# Patient Record
Sex: MALE | Race: WHITE | Employment: FULL TIME | URBAN - METROPOLITAN AREA
[De-identification: names, ages, dates, MRNs, and addresses within clinical notes are randomized per-mention and may not be internally consistent; named-entity substitution may affect disease eponyms.]

---

## 2017-04-17 ENCOUNTER — OFFICE VISIT (OUTPATIENT)
Dept: FAMILY MEDICINE | Facility: CLINIC | Age: 62
End: 2017-04-17
Payer: COMMERCIAL

## 2017-04-17 VITALS
BODY MASS INDEX: 28.79 KG/M2 | HEART RATE: 94 BPM | RESPIRATION RATE: 14 BRPM | TEMPERATURE: 96 F | DIASTOLIC BLOOD PRESSURE: 70 MMHG | WEIGHT: 190 LBS | HEIGHT: 68 IN | SYSTOLIC BLOOD PRESSURE: 126 MMHG

## 2017-04-17 DIAGNOSIS — E11.59 TYPE 2 DIABETES MELLITUS WITH OTHER CIRCULATORY COMPLICATIONS (H): ICD-10-CM

## 2017-04-17 DIAGNOSIS — I10 ESSENTIAL HYPERTENSION WITH GOAL BLOOD PRESSURE LESS THAN 140/90: Chronic | ICD-10-CM

## 2017-04-17 DIAGNOSIS — Z12.5 SCREENING FOR PROSTATE CANCER: ICD-10-CM

## 2017-04-17 DIAGNOSIS — N18.30 CHRONIC KIDNEY DISEASE, STAGE III (MODERATE) (H): Primary | ICD-10-CM

## 2017-04-17 DIAGNOSIS — I10 HYPERTENSION GOAL BP (BLOOD PRESSURE) < 140/90: Chronic | ICD-10-CM

## 2017-04-17 DIAGNOSIS — E78.5 HYPERLIPIDEMIA LDL GOAL <70: Chronic | ICD-10-CM

## 2017-04-17 DIAGNOSIS — E11.51 TYPE II DIABETES MELLITUS WITH PERIPHERAL CIRCULATORY DISORDER (H): ICD-10-CM

## 2017-04-17 DIAGNOSIS — I25.10 CORONARY ARTERY DISEASE INVOLVING NATIVE CORONARY ARTERY OF NATIVE HEART WITHOUT ANGINA PECTORIS: ICD-10-CM

## 2017-04-17 LAB
ALBUMIN SERPL-MCNC: 3.9 G/DL (ref 3.4–5)
ALBUMIN UR-MCNC: 30 MG/DL
ALP SERPL-CCNC: 102 U/L (ref 40–150)
ALT SERPL W P-5'-P-CCNC: 73 U/L (ref 0–70)
ANION GAP SERPL CALCULATED.3IONS-SCNC: 9 MMOL/L (ref 3–14)
APPEARANCE UR: CLEAR
AST SERPL W P-5'-P-CCNC: 30 U/L (ref 0–45)
BASOPHILS # BLD AUTO: 0 10E9/L (ref 0–0.2)
BASOPHILS NFR BLD AUTO: 0.4 %
BILIRUB SERPL-MCNC: 0.7 MG/DL (ref 0.2–1.3)
BILIRUB UR QL STRIP: ABNORMAL
BUN SERPL-MCNC: 25 MG/DL (ref 7–30)
CALCIUM SERPL-MCNC: 9.8 MG/DL (ref 8.5–10.1)
CHLORIDE SERPL-SCNC: 103 MMOL/L (ref 94–109)
CHOLEST SERPL-MCNC: 193 MG/DL
CO2 SERPL-SCNC: 26 MMOL/L (ref 20–32)
COLOR UR AUTO: YELLOW
CREAT SERPL-MCNC: 1.24 MG/DL (ref 0.66–1.25)
CREAT UR-MCNC: 283 MG/DL
DIFFERENTIAL METHOD BLD: NORMAL
EOSINOPHIL # BLD AUTO: 0.2 10E9/L (ref 0–0.7)
EOSINOPHIL NFR BLD AUTO: 2.1 %
ERYTHROCYTE [DISTWIDTH] IN BLOOD BY AUTOMATED COUNT: 13.8 % (ref 10–15)
GFR SERPL CREATININE-BSD FRML MDRD: 59 ML/MIN/1.7M2
GLUCOSE SERPL-MCNC: 298 MG/DL (ref 70–99)
GLUCOSE UR STRIP-MCNC: >=1000 MG/DL
HBA1C MFR BLD: 13.2 % (ref 4.3–6)
HCT VFR BLD AUTO: 44.4 % (ref 40–53)
HDLC SERPL-MCNC: 42 MG/DL
HGB BLD-MCNC: 14.9 G/DL (ref 13.3–17.7)
HGB UR QL STRIP: NEGATIVE
HYALINE CASTS #/AREA URNS LPF: ABNORMAL /LPF (ref 0–2)
KETONES UR STRIP-MCNC: ABNORMAL MG/DL
LDLC SERPL CALC-MCNC: 106 MG/DL
LEUKOCYTE ESTERASE UR QL STRIP: NEGATIVE
LYMPHOCYTES # BLD AUTO: 1.7 10E9/L (ref 0.8–5.3)
LYMPHOCYTES NFR BLD AUTO: 21.3 %
MCH RBC QN AUTO: 30.4 PG (ref 26.5–33)
MCHC RBC AUTO-ENTMCNC: 33.6 G/DL (ref 31.5–36.5)
MCV RBC AUTO: 91 FL (ref 78–100)
MICROALBUMIN UR-MCNC: 118 MG/L
MICROALBUMIN/CREAT UR: 41.7 MG/G CR (ref 0–17)
MONOCYTES # BLD AUTO: 0.7 10E9/L (ref 0–1.3)
MONOCYTES NFR BLD AUTO: 9 %
NEUTROPHILS # BLD AUTO: 5.2 10E9/L (ref 1.6–8.3)
NEUTROPHILS NFR BLD AUTO: 67.2 %
NITRATE UR QL: NEGATIVE
NONHDLC SERPL-MCNC: 151 MG/DL
PH UR STRIP: 5 PH (ref 5–7)
PLATELET # BLD AUTO: 251 10E9/L (ref 150–450)
POTASSIUM SERPL-SCNC: 4.3 MMOL/L (ref 3.4–5.3)
PROT SERPL-MCNC: 7.1 G/DL (ref 6.8–8.8)
PSA SERPL-ACNC: 0.64 UG/L (ref 0–4)
RBC # BLD AUTO: 4.9 10E12/L (ref 4.4–5.9)
RBC #/AREA URNS AUTO: ABNORMAL /HPF (ref 0–2)
SODIUM SERPL-SCNC: 138 MMOL/L (ref 133–144)
SP GR UR STRIP: >1.03 (ref 1–1.03)
TRIGL SERPL-MCNC: 225 MG/DL
TSH SERPL DL<=0.05 MIU/L-ACNC: 2.55 MU/L (ref 0.4–4)
URN SPEC COLLECT METH UR: ABNORMAL
UROBILINOGEN UR STRIP-ACNC: 0.2 EU/DL (ref 0.2–1)
WBC # BLD AUTO: 7.8 10E9/L (ref 4–11)
WBC #/AREA URNS AUTO: ABNORMAL /HPF (ref 0–2)

## 2017-04-17 PROCEDURE — 81001 URINALYSIS AUTO W/SCOPE: CPT | Performed by: FAMILY MEDICINE

## 2017-04-17 PROCEDURE — 99214 OFFICE O/P EST MOD 30 MIN: CPT | Performed by: FAMILY MEDICINE

## 2017-04-17 PROCEDURE — 82043 UR ALBUMIN QUANTITATIVE: CPT | Performed by: FAMILY MEDICINE

## 2017-04-17 PROCEDURE — 80050 GENERAL HEALTH PANEL: CPT | Performed by: FAMILY MEDICINE

## 2017-04-17 PROCEDURE — G0103 PSA SCREENING: HCPCS | Performed by: FAMILY MEDICINE

## 2017-04-17 PROCEDURE — 36415 COLL VENOUS BLD VENIPUNCTURE: CPT | Performed by: FAMILY MEDICINE

## 2017-04-17 PROCEDURE — 83036 HEMOGLOBIN GLYCOSYLATED A1C: CPT | Performed by: FAMILY MEDICINE

## 2017-04-17 PROCEDURE — 80061 LIPID PANEL: CPT | Performed by: FAMILY MEDICINE

## 2017-04-17 NOTE — PROGRESS NOTES
"  SUBJECTIVE:                                                    Buddy Jaramillo is a 61 year old male who presents to clinic today for the following health issues:        Diabetes Follow-up      Patient is checking blood sugars: not at all    Diabetic concerns: None     Symptoms of hypoglycemia (low blood sugar): none     Paresthesias (numbness or burning in feet) or sores: No     Date of last diabetic eye exam: 9/2016     CDE 3 years ago     Hyperlipidemia Follow-Up      Rate your low fat/cholesterol diet?: good    Taking statin?  Yes, no muscle aches from statin    Other lipid medications/supplements?:  none     Hypertension Follow-up      Outpatient blood pressures are not being checked.    Low Salt Diet: no added salt         Amount of exercise or physical activity: 2-3 days/week for an average of 15-30 minutes    Problems taking medications regularly: No    Medication side effects: none    Diet: diabetic          Problem list and histories reviewed & adjusted, as indicated.  Additional history: as documented    Patient Active Problem List   Diagnosis     CAD (coronary artery disease)     Hyperlipidemia LDL goal <70     Hypertension goal BP (blood pressure) < 140/90     Cluster headache     Type II diabetes mellitus with peripheral circulatory disorder (H)     Carotid stent occlusion (H)     Right carotid bruit     Chronic kidney disease, stage III (moderate)     Screening for prostate cancer     Past Surgical History:   Procedure Laterality Date     BACK SURGERY  1980    lumbar disc     CARDIAC SURGERY  2002    Stent placement     CAROTID ENDARTERECTOMY  2007 stent and re-do 2/2014    Left     cartotid en[       ORTHOPEDIC SURGERY      Lumbar disc surgery x 2       Social History   Substance Use Topics     Smoking status: Former Smoker     Packs/day: 1.00     Quit date: 1/10/2014     Smokeless tobacco: Never Used      Comment: \"I think I am\"     Alcohol use No      Comment: Rare special occasion     " "Family History   Problem Relation Age of Onset     HEART DISEASE Mother      C.A.D. Mother 69     HEART DISEASE Father      DIABETES Father      C.A.D. Father 60     3 vessel CAB     CANCER Brother      melanoma           Reviewed and updated as needed this visit by clinical staff  Tobacco  Allergies  Meds  Med Hx  Surg Hx  Fam Hx  Soc Hx      Reviewed and updated as needed this visit by Provider         ROS:  Constitutional, neuro, ENT, endocrine, pulmonary, cardiac, gastrointestinal, genitourinary, musculoskeletal, integument and psychiatric systems are negative, except as otherwise noted.    OBJECTIVE:                                                    /70  Pulse 94  Temp 96  F (35.6  C) (Tympanic)  Resp 14  Ht 5' 7.5\" (1.715 m)  Wt 190 lb (86.2 kg)  BMI 29.32 kg/m2  Body mass index is 29.32 kg/(m^2).  GENERAL APPEARANCE: healthy, alert and no distress         ASSESSMENT/PLAN:                                                        ICD-10-CM    1. Chronic kidney disease, stage III (moderate) N18.3    2. Type II diabetes mellitus with peripheral circulatory disorder (H) E11.51    3. Coronary artery disease involving native coronary artery of native heart without angina pectoris I25.10    4. Hypertension goal BP (blood pressure) < 140/90 I10    5. Hyperlipidemia LDL goal <70 E78.5    6. Screening for prostate cancer Z12.5        There are no Patient Instructions on file for this visit.    Joby Phillips MD  [facility]    "

## 2017-04-17 NOTE — NURSING NOTE
"Chief Complaint   Patient presents with     Diabetes     Hypertension     Lipids       Initial /70  Pulse 94  Temp 96  F (35.6  C) (Tympanic)  Resp 14  Ht 5' 7.5\" (1.715 m)  Wt 190 lb (86.2 kg)  BMI 29.32 kg/m2 Estimated body mass index is 29.32 kg/(m^2) as calculated from the following:    Height as of this encounter: 5' 7.5\" (1.715 m).    Weight as of this encounter: 190 lb (86.2 kg).  Medication Reconciliation: complete       Montse Stevens CMA      "

## 2017-04-17 NOTE — PATIENT INSTRUCTIONS
Will check labs and see back pending review. Lantus and needles were refilled. The rest of his medications are filled at the [facility].

## 2017-04-17 NOTE — MR AVS SNAPSHOT
After Visit Summary   4/17/2017    Buddy Jaramillo    MRN: [id number]           Patient Information     Date Of Birth          1955        Visit Information        Provider Department      4/17/2017 7:45 AM Joby Phillips MD [facility]        Today's Diagnoses     Chronic kidney disease, stage III (moderate)    -  1    Type II diabetes mellitus with peripheral circulatory disorder (H)        Coronary artery disease involving native coronary artery of native heart without angina pectoris        Hypertension goal BP (blood pressure) < 140/90        Hyperlipidemia LDL goal <70        Screening for prostate cancer        Type 2 diabetes mellitus with other circulatory complications (H)        Essential hypertension with goal blood pressure less than 140/90          Care Instructions    Will check labs and see back pending review. Lantus and needles were refilled. The rest of his medications are filled at the [facility].        Follow-ups after your visit        Who to contact     If you have questions or need follow up information about today's clinic visit or your schedule please contact [facility] directly at [phone].  Normal or non-critical lab and imaging results will be communicated to you by [employer], letter or phone within 4 business days after the clinic has received the results. If you do not hear from us within 7 days, please contact the clinic through [employer] or phone. If you have a critical or abnormal lab result, we will notify you by phone as soon as possible.  Submit refill requests through [employer] or call your pharmacy and they will forward the refill request to us. Please allow 3 business days for your refill to be completed.          Additional Information About Your Visit        [employer] Information     [employer] gives you secure access to your electronic health record. If you see a primary care provider, you can  "also send messages to your care team and make appointments. If you have questions, please call your primary care clinic.  If you do not have a primary care provider, please call [phone] and they will assist you.        Care EveryWhere ID     This is your Care EveryWhere ID. This could be used by other organizations to access your [facility] medical records  [id number]        Your Vitals Were     Pulse Temperature Respirations Height BMI (Body Mass Index)       94 96  F (35.6  C) (Tympanic) 14 5' 7.5\" (1.715 m) 29.32 kg/m2        Blood Pressure from Last 3 Encounters:   04/17/17 126/70   06/15/16 120/80   05/11/16 126/74    Weight from Last 3 Encounters:   04/17/17 190 lb (86.2 kg)   06/15/16 197 lb (89.4 kg)   05/11/16 199 lb (90.3 kg)              We Performed the Following     Albumin Random Urine Quantitative     CBC with platelets differential     Comprehensive metabolic panel     Hemoglobin A1c     Lipid Profile     Prostate spec antigen screen     TSH     UA with Microscopic          Today's Medication Changes          These changes are accurate as of: 4/17/17  8:06 AM.  If you have any questions, ask your nurse or doctor.               Start taking these medicines.        Dose/Directions    insulin glargine 100 UNIT/ML injection   Commonly known as:  LANTUS   Used for:  Type 2 diabetes mellitus with other circulatory complications (H)   Replaces:  insulin glargine 100 UNIT/ML injection   Started by:  Joby Phillips MD        Dose:  22 Units   Inject 22 Units Subcutaneous At Bedtime   Quantity:  9 mL   Refills:  1         Stop taking these medicines if you haven't already. Please contact your care team if you have questions.     clopidogrel 75 MG tablet   Commonly known as:  PLAVIX   Stopped by:  Joby Phillips MD           insulin glargine 100 UNIT/ML injection   Commonly known as:  LANTUS SOLOSTAR   Replaced by:  insulin glargine 100 UNIT/ML injection   Stopped by:  Joby Phillips" MD Osvaldo                Where to get your medicines      These medications were sent to [id number] - [place], [place] - [address] [facility]  [address] [facility], [facility] [address]     Phone:  [phone]     insulin pen needle 31G X 5 MM         Some of these will need a paper prescription and others can be bought over the counter.  Ask your nurse if you have questions.     Bring a paper prescription for each of these medications     insulin glargine 100 UNIT/ML injection                Primary Care Provider Office Phone # Fax #    Joby Phillips [phone]       [facility] [address] [facility] [facility] [address]        Thank you!     Thank you for choosing [facility]  for your care. Our goal is always to provide you with excellent care. Hearing back from our patients is one way we can continue to improve our services. Please take a few minutes to complete the written survey that you may receive in the mail after your visit with us. Thank you!             Your Updated Medication List - Protect others around you: Learn how to safely use, store and throw away your medicines at www.disposemymeds.org.          This list is accurate as of: 4/17/17  8:06 AM.  Always use your most recent med list.                   Brand Name Dispense Instructions for use    aspirin  MG EC tablet     90 tablet    Take 1 tablet (325 mg) by mouth daily       blood glucose monitoring test strip    MAXIMILIAN CONTOUR NEXT    300 strip    Test 3 times daily       glipiZIDE 5 MG tablet    GLUCOTROL    90 tablet    Take 1 tablet (5 mg) by mouth 2 times daily (before meals)       IBUPROFEN PO      Take 800 mg by mouth daily       insulin glargine 100 UNIT/ML injection    LANTUS    9 mL    Inject 22 Units Subcutaneous At Bedtime       insulin pen needle 31G X 5 MM     100 each    Pt uses insulin 1x/day       lisinopril 10 MG tablet    PRINIVIL/ZESTRIL    90  tablet    Take 0.5 tablets (5 mg) by mouth daily       metFORMIN 1000 MG tablet    GLUCOPHAGE    180 tablet    Take 1 tablet (1,000 mg) by mouth 2 times daily (with meals)       Multi-vitamin Tabs tablet   Generic drug:  multivitamin, therapeutic with minerals      Take  by mouth daily.       * order for DME     1 each    Glucometer, brand as covered by insurance.       * order for DME     100 each    Test strips for pt's glucometer, brand as covered by insurance. Test daily and prn.       saxagliptin 5 MG Tabs tablet    ONGLYZA    90 tablet    Take 1 tablet (5 mg) by mouth daily       simvastatin 40 MG tablet    ZOCOR    90 tablet    Take 1 tablet (40 mg) by mouth At Bedtime       verapamil 240 MG CR tablet    CALAN-SR    90 tablet    Take 1 tablet (240 mg) by mouth daily       VITAMIN C PO      Take 500 mg by mouth daily.       * Notice:  This list has 2 medication(s) that are the same as other medications prescribed for you. Read the directions carefully, and ask your doctor or other care provider to review them with you.

## 2017-04-21 DIAGNOSIS — I10 ESSENTIAL HYPERTENSION WITH GOAL BLOOD PRESSURE LESS THAN 140/90: Chronic | ICD-10-CM

## 2017-04-21 NOTE — TELEPHONE ENCOUNTER
verapamil (CALAN-SR) 240 MG tablet      Last Written Prescription Date: 6/15/16  Last Fill Quantity: 90, # refills: 1  Last Office Visit with G, P or [facility] prescribing provider: 4/17/17       Potassium   Date Value Ref Range Status   04/17/2017 4.3 3.4 - 5.3 mmol/L Final     Creatinine   Date Value Ref Range Status   04/17/2017 1.24 0.66 - 1.25 mg/dL Final     BP Readings from Last 3 Encounters:   04/17/17 126/70   06/15/16 120/80   05/11/16 126/74

## 2017-04-24 RX ORDER — VERAPAMIL HYDROCHLORIDE 240 MG/1
240 TABLET, FILM COATED, EXTENDED RELEASE ORAL DAILY
Qty: 90 TABLET | Refills: 3 | Status: SHIPPED | OUTPATIENT
Start: 2017-04-24

## 2017-08-04 NOTE — TELEPHONE ENCOUNTER
Routing refill request to provider for review/approval because:  Labs out of range:    Lab Results   Component Value Date    A1C 13.2 04/17/2017    A1C 11.0 05/11/2016    A1C 8.6 08/31/2015    A1C 6.6 03/04/2015    A1C 6.3 07/07/2014

## 2017-08-05 RX ORDER — INSULIN GLARGINE 100 [IU]/ML
INJECTION, SOLUTION SUBCUTANEOUS
Qty: 9 ML | Refills: 0 | Status: SHIPPED | OUTPATIENT
Start: 2017-08-05 | End: 2017-09-12

## 2017-08-22 ENCOUNTER — OFFICE VISIT (OUTPATIENT)
Dept: FAMILY MEDICINE | Facility: CLINIC | Age: 62
End: 2017-08-22
Payer: COMMERCIAL

## 2017-08-22 VITALS
SYSTOLIC BLOOD PRESSURE: 130 MMHG | RESPIRATION RATE: 16 BRPM | TEMPERATURE: 98 F | WEIGHT: 192 LBS | HEART RATE: 68 BPM | DIASTOLIC BLOOD PRESSURE: 70 MMHG | BODY MASS INDEX: 29.63 KG/M2

## 2017-08-22 DIAGNOSIS — N18.30 CHRONIC KIDNEY DISEASE, STAGE III (MODERATE) (H): ICD-10-CM

## 2017-08-22 DIAGNOSIS — E11.51 TYPE II DIABETES MELLITUS WITH PERIPHERAL CIRCULATORY DISORDER (H): ICD-10-CM

## 2017-08-22 DIAGNOSIS — I25.10 CORONARY ARTERY DISEASE INVOLVING NATIVE CORONARY ARTERY OF NATIVE HEART WITHOUT ANGINA PECTORIS: ICD-10-CM

## 2017-08-22 DIAGNOSIS — I10 HYPERTENSION GOAL BP (BLOOD PRESSURE) < 140/90: Primary | Chronic | ICD-10-CM

## 2017-08-22 DIAGNOSIS — E78.5 HYPERLIPIDEMIA LDL GOAL <70: Chronic | ICD-10-CM

## 2017-08-22 PROCEDURE — 99214 OFFICE O/P EST MOD 30 MIN: CPT | Performed by: FAMILY MEDICINE

## 2017-08-22 NOTE — PROGRESS NOTES
"  SUBJECTIVE:   Buddy Jaramillo is a 61 year old male who presents to clinic today for the following health issues:      Diabetes Follow-up      Patient is checking blood sugars: not at all    Diabetic concerns: None     Symptoms of hypoglycemia (low blood sugar): none     Paresthesias (numbness or burning in feet) or sores: No     Date of last diabetic eye exam:     Hypertension Follow-up      Outpatient blood pressures are not being checked.    Low Salt Diet: no added salt          Amount of exercise or physical activity: None    Problems taking medications regularly: No    Medication side effects: none- CONCERNED ABOUT ED  Diet: regular (no restrictions)      ED      Duration: 1.5 years    Description (location/character/radiation): n/a    Intensity:  moderate    Accompanying signs and symptoms: none    History (similar episodes/previous evaluation): None    Precipitating or alleviating factors: None    Therapies tried and outcome: None         Problem list and histories reviewed & adjusted, as indicated.  Additional history: as documented    Patient Active Problem List   Diagnosis     CAD (coronary artery disease)     Hyperlipidemia LDL goal <70     Hypertension goal BP (blood pressure) < 140/90     Cluster headache     Type II diabetes mellitus with peripheral circulatory disorder (H)     Carotid stent occlusion (H)     Right carotid bruit     Chronic kidney disease, stage III (moderate)     Screening for prostate cancer     Past Surgical History:   Procedure Laterality Date     BACK SURGERY  1980    lumbar disc     CARDIAC SURGERY  2002    Stent placement     CAROTID ENDARTERECTOMY  2007 stent and re-do 2/2014    Left     cartotid en[       ORTHOPEDIC SURGERY      Lumbar disc surgery x 2       Social History   Substance Use Topics     Smoking status: Former Smoker     Packs/day: 1.00     Quit date: 1/10/2014     Smokeless tobacco: Never Used      Comment: \"I think I am\"     Alcohol use No      Comment: " Rare special occasion     Family History   Problem Relation Age of Onset     HEART DISEASE Mother      C.A.D. Mother 69     HEART DISEASE Father      DIABETES Father      C.A.D. Father 60     3 vessel CAB     CANCER Brother      melanoma             Reviewed and updated as needed this visit by clinical staffTobacco  Meds  Med Hx  Surg Hx  Fam Hx  Soc Hx      Reviewed and updated as needed this visit by Provider         ROS:  Constitutional, neuro, ENT, endocrine, pulmonary, cardiac, gastrointestinal, genitourinary, musculoskeletal, integument and psychiatric systems are negative, except as otherwise noted.  RESP:NEGATIVE for significant cough or SOB  CV: NEGATIVE for chest pain, palpitations or peripheral edema  ENDOCRINE: NEGATIVE for temperature intolerance, skin/hair changes, polydipsia, polyphagia and polyuria      OBJECTIVE:                                                    /70  Pulse 68  Temp 98  F (36.7  C) (Tympanic)  Resp 16  Wt 192 lb (87.1 kg)  BMI 29.63 kg/m2  Body mass index is 29.63 kg/(m^2).  GENERAL APPEARANCE: healthy, alert and no distress         ASSESSMENT/PLAN:                                                        ICD-10-CM    1. Hypertension goal BP (blood pressure) < 140/90 I10 UA with Microscopic     CBC with platelets differential     Comprehensive metabolic panel   2. Hyperlipidemia LDL goal <70 E78.5 Lipid Profile   3. Chronic kidney disease, stage III (moderate) N18.3    4. Type II diabetes mellitus with peripheral circulatory disorder (H) E11.51 Albumin Random Urine Quantitative     Hemoglobin A1c   5. Coronary artery disease involving native coronary artery of native heart without angina pectoris I25.10        Patient Instructions   Will await labs before addressing erectile dysfunction issues. Patient was encouraged to pay more attention to his diabetes. Clearly, he has not been doing that.      Joby Phillips MD  [facility]

## 2017-08-22 NOTE — NURSING NOTE
"Chief Complaint   Patient presents with     Hypertension     Diabetes     Erectile Dysfunction       Initial /70  Pulse 68  Temp 98  F (36.7  C) (Tympanic)  Resp 16  Wt 192 lb (87.1 kg)  BMI 29.63 kg/m2 Estimated body mass index is 29.63 kg/(m^2) as calculated from the following:    Height as of 4/17/17: 5' 7.5\" (1.715 m).    Weight as of this encounter: 192 lb (87.1 kg).  Medication Reconciliation: complete     Montse Stevens CMA      "

## 2017-08-23 DIAGNOSIS — E11.51 TYPE II DIABETES MELLITUS WITH PERIPHERAL CIRCULATORY DISORDER (H): ICD-10-CM

## 2017-08-23 DIAGNOSIS — I10 HYPERTENSION GOAL BP (BLOOD PRESSURE) < 140/90: Chronic | ICD-10-CM

## 2017-08-23 DIAGNOSIS — E78.5 HYPERLIPIDEMIA LDL GOAL <70: Chronic | ICD-10-CM

## 2017-08-23 LAB
ALBUMIN SERPL-MCNC: 3.9 G/DL (ref 3.4–5)
ALBUMIN UR-MCNC: 100 MG/DL
ALP SERPL-CCNC: 94 U/L (ref 40–150)
ALT SERPL W P-5'-P-CCNC: 51 U/L (ref 0–70)
ANION GAP SERPL CALCULATED.3IONS-SCNC: 10 MMOL/L (ref 3–14)
APPEARANCE UR: CLEAR
AST SERPL W P-5'-P-CCNC: 25 U/L (ref 0–45)
BASOPHILS # BLD AUTO: 0.1 10E9/L (ref 0–0.2)
BASOPHILS NFR BLD AUTO: 0.7 %
BILIRUB SERPL-MCNC: 0.8 MG/DL (ref 0.2–1.3)
BILIRUB UR QL STRIP: NEGATIVE
BUN SERPL-MCNC: 18 MG/DL (ref 7–30)
CALCIUM SERPL-MCNC: 6.5 MG/DL (ref 8.5–10.1)
CHLORIDE SERPL-SCNC: 105 MMOL/L (ref 94–109)
CHOLEST SERPL-MCNC: 141 MG/DL
CO2 SERPL-SCNC: 24 MMOL/L (ref 20–32)
COLOR UR AUTO: YELLOW
CREAT SERPL-MCNC: 1.1 MG/DL (ref 0.66–1.25)
CREAT UR-MCNC: 169 MG/DL
DIFFERENTIAL METHOD BLD: NORMAL
EOSINOPHIL # BLD AUTO: 0.3 10E9/L (ref 0–0.7)
EOSINOPHIL NFR BLD AUTO: 3.3 %
ERYTHROCYTE [DISTWIDTH] IN BLOOD BY AUTOMATED COUNT: 13.8 % (ref 10–15)
GFR SERPL CREATININE-BSD FRML MDRD: 67 ML/MIN/1.7M2
GLUCOSE SERPL-MCNC: 209 MG/DL (ref 70–99)
GLUCOSE UR STRIP-MCNC: 500 MG/DL
HBA1C MFR BLD: 12.4 % (ref 4.3–6)
HCT VFR BLD AUTO: 43.4 % (ref 40–53)
HDLC SERPL-MCNC: 57 MG/DL
HGB BLD-MCNC: 14.4 G/DL (ref 13.3–17.7)
HGB UR QL STRIP: NEGATIVE
KETONES UR STRIP-MCNC: NEGATIVE MG/DL
LDLC SERPL CALC-MCNC: 53 MG/DL
LEUKOCYTE ESTERASE UR QL STRIP: NEGATIVE
LYMPHOCYTES # BLD AUTO: 2.5 10E9/L (ref 0.8–5.3)
LYMPHOCYTES NFR BLD AUTO: 33 %
MCH RBC QN AUTO: 30.6 PG (ref 26.5–33)
MCHC RBC AUTO-ENTMCNC: 33.2 G/DL (ref 31.5–36.5)
MCV RBC AUTO: 92 FL (ref 78–100)
MICROALBUMIN UR-MCNC: 283 MG/L
MICROALBUMIN/CREAT UR: 167.46 MG/G CR (ref 0–17)
MONOCYTES # BLD AUTO: 0.8 10E9/L (ref 0–1.3)
MONOCYTES NFR BLD AUTO: 11.1 %
NEUTROPHILS # BLD AUTO: 4 10E9/L (ref 1.6–8.3)
NEUTROPHILS NFR BLD AUTO: 51.9 %
NITRATE UR QL: NEGATIVE
NONHDLC SERPL-MCNC: 84 MG/DL
PH UR STRIP: 5.5 PH (ref 5–7)
PLATELET # BLD AUTO: 281 10E9/L (ref 150–450)
POTASSIUM SERPL-SCNC: 4.1 MMOL/L (ref 3.4–5.3)
PROT SERPL-MCNC: 6.9 G/DL (ref 6.8–8.8)
RBC # BLD AUTO: 4.71 10E12/L (ref 4.4–5.9)
RBC #/AREA URNS AUTO: ABNORMAL /HPF
SODIUM SERPL-SCNC: 139 MMOL/L (ref 133–144)
SOURCE: ABNORMAL
SP GR UR STRIP: 1.02 (ref 1–1.03)
TRIGL SERPL-MCNC: 154 MG/DL
UROBILINOGEN UR STRIP-ACNC: 0.2 EU/DL (ref 0.2–1)
WBC # BLD AUTO: 7.6 10E9/L (ref 4–11)
WBC #/AREA URNS AUTO: ABNORMAL /HPF

## 2017-08-23 PROCEDURE — 80061 LIPID PANEL: CPT | Performed by: FAMILY MEDICINE

## 2017-08-23 PROCEDURE — 85025 COMPLETE CBC W/AUTO DIFF WBC: CPT | Performed by: FAMILY MEDICINE

## 2017-08-23 PROCEDURE — 81001 URINALYSIS AUTO W/SCOPE: CPT | Performed by: FAMILY MEDICINE

## 2017-08-23 PROCEDURE — 36415 COLL VENOUS BLD VENIPUNCTURE: CPT | Performed by: FAMILY MEDICINE

## 2017-08-23 PROCEDURE — 82043 UR ALBUMIN QUANTITATIVE: CPT | Performed by: FAMILY MEDICINE

## 2017-08-23 PROCEDURE — 83036 HEMOGLOBIN GLYCOSYLATED A1C: CPT | Performed by: FAMILY MEDICINE

## 2017-08-23 PROCEDURE — 80053 COMPREHEN METABOLIC PANEL: CPT | Performed by: FAMILY MEDICINE

## 2017-08-26 NOTE — PATIENT INSTRUCTIONS
Will await labs before addressing erectile dysfunction issues. Patient was encouraged to pay more attention to his diabetes. Clearly, he has not been doing that.

## 2017-09-12 ENCOUNTER — OFFICE VISIT (OUTPATIENT)
Dept: FAMILY MEDICINE | Facility: CLINIC | Age: 62
End: 2017-09-12
Payer: COMMERCIAL

## 2017-09-12 VITALS
WEIGHT: 191 LBS | RESPIRATION RATE: 16 BRPM | SYSTOLIC BLOOD PRESSURE: 128 MMHG | HEART RATE: 66 BPM | BODY MASS INDEX: 29.47 KG/M2 | TEMPERATURE: 97 F | DIASTOLIC BLOOD PRESSURE: 60 MMHG

## 2017-09-12 DIAGNOSIS — I25.10 CORONARY ARTERY DISEASE INVOLVING NATIVE CORONARY ARTERY OF NATIVE HEART WITHOUT ANGINA PECTORIS: ICD-10-CM

## 2017-09-12 DIAGNOSIS — E11.51 TYPE II DIABETES MELLITUS WITH PERIPHERAL CIRCULATORY DISORDER (H): ICD-10-CM

## 2017-09-12 DIAGNOSIS — Z13.89 SCREENING FOR DIABETIC PERIPHERAL NEUROPATHY: ICD-10-CM

## 2017-09-12 DIAGNOSIS — Z79.4 TYPE 2 DIABETES MELLITUS WITH DIABETIC NEPHROPATHY, WITH LONG-TERM CURRENT USE OF INSULIN (H): Primary | ICD-10-CM

## 2017-09-12 DIAGNOSIS — E11.21 TYPE 2 DIABETES MELLITUS WITH DIABETIC NEPHROPATHY, WITH LONG-TERM CURRENT USE OF INSULIN (H): Primary | ICD-10-CM

## 2017-09-12 DIAGNOSIS — E66.3 PATIENT OVERWEIGHT: ICD-10-CM

## 2017-09-12 PROCEDURE — 99214 OFFICE O/P EST MOD 30 MIN: CPT | Performed by: FAMILY MEDICINE

## 2017-09-12 PROCEDURE — 99207 C FOOT EXAM  NO CHARGE: CPT | Performed by: FAMILY MEDICINE

## 2017-09-12 NOTE — MR AVS SNAPSHOT
After Visit Summary   9/12/2017    Buddy Jaramillo    MRN: [id number]           Patient Information     Date Of Birth          1955        Visit Information        Provider Department      9/12/2017 8:00 AM Joby Phillips MD [facility]        Today's Diagnoses     Type 2 diabetes mellitus with diabetic nephropathy, with long-term current use of insulin (H)    -  1    Type II diabetes mellitus with peripheral circulatory disorder (H)        Coronary artery disease involving native coronary artery of native heart without angina pectoris        Screening for diabetic peripheral neuropathy          Care Instructions      Preventive Health Recommendations  Male Ages 50 - 64    Yearly exam:             See your health care provider every year in order to  o   Review health changes.   o   Discuss preventive care.    o   Review your medicines if your doctor has prescribed any.     Have a cholesterol test every 5 years, or more frequently if you are at risk for high cholesterol/heart disease.     Have a diabetes test (fasting glucose) every three years. If you are at risk for diabetes, you should have this test more often.     Have a colonoscopy at age 50, or have a yearly FIT test (stool test). These exams will check for colon cancer.      Talk with your health care provider about whether or not a prostate cancer screening test (PSA) is right for you.    You should be tested each year for STDs (sexually transmitted diseases), if you re at risk.     Shots: Get a flu shot each year. Get a tetanus shot every 10 years.     Nutrition:    Eat at least 5 servings of fruits and vegetables daily.     Eat whole-grain bread, whole-wheat pasta and brown rice instead of white grains and rice.     Talk to your provider about Calcium and Vitamin D.     Lifestyle    Exercise for at least 150 minutes a week (30 minutes a day, 5 days a week). This will help you control your  weight and prevent disease.     Limit alcohol to one drink per day.     No smoking.     Wear sunscreen to prevent skin cancer.     See your dentist every six months for an exam and cleaning.     See your eye doctor every 1 to 2 years.            Follow-ups after your visit        Additional Services     DIABETES EDUCATOR REFERRAL       DIABETES SELF MANAGEMENT TRAINING (DSMT)      Your provider has referred you to Diabetes Education: FMG: Diabetes Education - All [facility] [phone]   [email or web address]    Type of training and number of hours: Previous Diagnosis: Follow-up DSMT - 2 hours.    Medicare covers: 10 hours of initial DSMT in 12 month period from the time of first visit, plus 2 hours of follow-up DSMT annually, and additional hours as requested for insulin training.    Diabetes Type: Type 2 - On Insulin             Diabetes Co-Morbidities: none               A1C Goal:  <8.0       A1C is: Lab Results       Component                Value               Date                       A1C                      12.4                08/23/2017              If an urgent visit is needed or A1C is above 12, Care Team to call the Diabetes Education Team at [phone] or send an In Basket message to the Diabetes Education Pool (P DIAB ED-PATIENT CARE).    Diabetes Education Topics: Comprehensive Knowledge Assessment and Instruction and Blood glucose meter instruction     Special Educational Needs Requiring Individual DSMT: None       MEDICAL NUTRITION THERAPY (MNT) for Diabetes    Medical Nutrition Therapy with a Registered Dietitian can be provided in coordination with Diabetes Self-Management Training to assist in achieving optimal diabetes management.    MNT Type and Hours: Do not initiate MNT at this time.                       Medicare will cover: 3 hours initial MNT in 12 month period after first visit, plus 2 hours of follow-up MNT annually    Please be  "aware that coverage of these services is subject to the terms and limitations of your health insurance plan.  Call member services at your health plan to determine Diabetes Self-Management Training benefits and ask which blood glucose monitor brands are covered by your plan.      Please bring the following with you to your appointment:    (1)  List of current medications   (2)  List of Blood Glucose Monitor brands that are covered by your insurance plan  (3)  Blood Glucose Monitor and log book  (4)   Food records for the 3 days prior to your visit    The Certified Diabetes Educator may make diabetes medication adjustments per the CDE Protocol and Collaborative Practice Agreement.                  Who to contact     If you have questions or need follow up information about today's clinic visit or your schedule please contact [facility] directly at [phone].  Normal or non-critical lab and imaging results will be communicated to you by MyChart, letter or phone within 4 business days after the clinic has received the results. If you do not hear from us within 7 days, please contact the clinic through MyChart or phone. If you have a critical or abnormal lab result, we will notify you by phone as soon as possible.  Submit refill requests through [employer] or call your pharmacy and they will forward the refill request to us. Please allow 3 business days for your refill to be completed.          Additional Information About Your Visit        [employer] Information     [employer] lets you send messages to your doctor, view your test results, renew your prescriptions, schedule appointments and more. To sign up, go to [email or web address] . Click on \"Log in\" on the left side of the screen, which will take you to the Welcome page. Then click on \"Sign up Now\" on the right side of the page.     You will be asked to enter the access code listed below, as well as some personal information. Please " follow the directions to create your username and password.     Your access code is: [email or web address]  Expires: 2017  8:41 AM     Your access code will  in 90 days. If you need help or a new code, please call your [facility] clinic or [phone].        Care EveryWhere ID     This is your Care EveryWhere ID. This could be used by other organizations to access your [facility] medical records  [id number]        Your Vitals Were     Pulse Temperature Respirations BMI (Body Mass Index)          66 97  F (36.1  C) (Tympanic) 16 29.47 kg/m2         Blood Pressure from Last 3 Encounters:   17 128/60   17 130/70   17 126/70    Weight from Last 3 Encounters:   17 191 lb (86.6 kg)   17 192 lb (87.1 kg)   17 190 lb (86.2 kg)              We Performed the Following     DIABETES EDUCATOR REFERRAL     FOOT EXAM  NO CHARGE [90642.114]          Today's Medication Changes          These changes are accurate as of: 17  8:41 AM.  If you have any questions, ask your nurse or doctor.               These medicines have changed or have updated prescriptions.        Dose/Directions    insulin glargine 100 UNIT/ML injection   Commonly known as:  LANTUS SOLOSTAR   This may have changed:  See the new instructions.   Changed by:  Joby Phillips MD        Dose:  26 Units   Inject 26 Units Subcutaneous At Bedtime   Quantity:  9 mL   Refills:  0            Where to get your medicines      Some of these will need a paper prescription and others can be bought over the counter.  Ask your nurse if you have questions.     You don't need a prescription for these medications     insulin glargine 100 UNIT/ML injection                Primary Care Provider Office Phone # Fax #    Joby Phillips [phone] [address] [facility] [address]        Equal Access to Services     ISI ANGUIANO AH: Hadii jonel ku danao Sochris, waaxda luqadaha, qaybta kaalmaanderson peguero,  quique handjimmie castro'aan ah. So [facility] [phone].    ATENCIÓN: Si jaradla segun, tiene a ware disposición servicios gratuitos de asistencia lingüística. Matt miranda [phone].    We comply with applicable federal civil rights laws and [place] laws. We do not discriminate on the basis of race, color, national origin, age, disability sex, sexual orientation or gender identity.            Thank you!     Thank you for choosing [facility]  for your care. Our goal is always to provide you with excellent care. Hearing back from our patients is one way we can continue to improve our services. Please take a few minutes to complete the written survey that you may receive in the mail after your visit with us. Thank you!             Your Updated Medication List - Protect others around you: Learn how to safely use, store and throw away your medicines at www.disposemymeds.org.          This list is accurate as of: 9/12/17  8:41 AM.  Always use your most recent med list.                   Brand Name Dispense Instructions for use Diagnosis    aspirin  MG EC tablet     90 tablet    Take 1 tablet (325 mg) by mouth daily    Carotid artery disease (H)       blood glucose monitoring test strip    MAXIMILIAN CONTOUR NEXT    300 strip    Test 3 times daily    Type 2 diabetes, HbA1C goal < 8% (H)       glipiZIDE 5 MG tablet    GLUCOTROL    90 tablet    Take 1 tablet (5 mg) by mouth 2 times daily (before meals)    Type 2 diabetes mellitus with other circulatory complications (H)       IBUPROFEN PO      Take 800 mg by mouth daily        insulin glargine 100 UNIT/ML injection    LANTUS SOLOSTAR    9 mL    Inject 26 Units Subcutaneous At Bedtime        insulin pen needle 31G X 5 MM     100 each    Pt uses insulin 1x/day    Type II diabetes mellitus with peripheral circulatory disorder (H)       lisinopril 10 MG tablet    PRINIVIL/ZESTRIL    90 tablet    Take 0.5 tablets (5 mg) by mouth daily     Type II diabetes mellitus with peripheral circulatory disorder (H), Essential hypertension with goal blood pressure less than 140/90       metFORMIN 1000 MG tablet    GLUCOPHAGE    180 tablet    Take 1 tablet (1,000 mg) by mouth 2 times daily (with meals)    Type II diabetes mellitus with peripheral circulatory disorder (H)       Multi-vitamin Tabs tablet   Generic drug:  multivitamin, therapeutic with minerals      Take  by mouth daily.        * order for DME     1 each    Glucometer, brand as covered by insurance.    Type II diabetes mellitus with peripheral circulatory disorder (H)       * order for DME     100 each    Test strips for pt's glucometer, brand as covered by insurance. Test daily and prn.    Type II diabetes mellitus with peripheral circulatory disorder (H)       saxagliptin 5 MG Tabs tablet    ONGLYZA    90 tablet    Take 1 tablet (5 mg) by mouth daily    Type 2 diabetes mellitus with other circulatory complications (H)       simvastatin 40 MG tablet    ZOCOR    90 tablet    Take 1 tablet (40 mg) by mouth At Bedtime    Type 2 diabetes mellitus with other circulatory complications (H)       verapamil 240 MG CR tablet    CALAN-SR    90 tablet    Take 1 tablet (240 mg) by mouth daily    Essential hypertension with goal blood pressure less than 140/90       VITAMIN C PO      Take 500 mg by mouth daily.        * Notice:  This list has 2 medication(s) that are the same as other medications prescribed for you. Read the directions carefully, and ask your doctor or other care provider to review them with you.

## 2017-09-12 NOTE — NURSING NOTE
"Chief Complaint   Patient presents with     Diabetes     lab results     Lipids     Refill Request       Initial /60  Pulse 66  Temp 97  F (36.1  C) (Tympanic)  Resp 16  Wt 191 lb (86.6 kg)  BMI 29.47 kg/m2 Estimated body mass index is 29.47 kg/(m^2) as calculated from the following:    Height as of 4/17/17: 5' 7.5\" (1.715 m).    Weight as of this encounter: 191 lb (86.6 kg).  Medication Reconciliation: complete     Montse Stevens CMA      "

## 2017-09-12 NOTE — PROGRESS NOTES
"  Physical     SUBJECTIVE:   Buddy Jaramillo is a 62 year old male who presents to clinic today for the following health issues:      Diabetes Follow-up      Patient is checking blood sugars: not at all    Diabetic concerns: None     Symptoms of hypoglycemia (low blood sugar): none     Paresthesias (numbness or burning in feet) or sores: No     Date of last diabetic eye exam: Needs to be accomplished      Hyperlipidemia Follow-Up      Rate your low fat/cholesterol diet?: fair    Taking statin?  Yes, no muscle aches from statin    Other lipid medications/supplements?:  none          Amount of exercise or physical activity: None    Problems taking medications regularly: No    Medication side effects: none  Diet: regular (no restrictions)          Problem list and histories reviewed & adjusted, as indicated.  Additional history: The patient has largely been ignoring his diabetes.  He went for diabetic education at [facility] many years ago.    Patient Active Problem List   Diagnosis     CAD (coronary artery disease)     Hyperlipidemia LDL goal <70     Hypertension goal BP (blood pressure) < 140/90     Cluster headache     Type II diabetes mellitus with peripheral circulatory disorder (H)     Carotid stent occlusion (H)     Right carotid bruit     Chronic kidney disease, stage III (moderate)     Screening for prostate cancer     Past Surgical History:   Procedure Laterality Date     BACK SURGERY  1980    lumbar disc     CARDIAC SURGERY  2002    Stent placement     CAROTID ENDARTERECTOMY  2007 stent and re-do 2/2014    Left     cartotid en[       ORTHOPEDIC SURGERY      Lumbar disc surgery x 2       Social History   Substance Use Topics     Smoking status: Former Smoker     Packs/day: 1.00     Quit date: 1/10/2014     Smokeless tobacco: Never Used      Comment: \"I think I am\"     Alcohol use No      Comment: Rare special occasion     Family History   Problem Relation Age of Onset     HEART DISEASE Mother      C.A.D. " Mother 69     HEART DISEASE Father      DIABETES Father      C.A.D. Father 60     3 vessel CAB     CANCER Brother      melanoma             Reviewed and updated as needed this visit by clinical staffTobacco  Allergies  Meds  Med Hx  Surg Hx  Fam Hx  Soc Hx      Reviewed and updated as needed this visit by Provider         ROS:  Constitutional, HEENT, cardiovascular, pulmonary, gi and gu systems are negative, except as otherwise noted.  CONSTITUTIONAL:NEGATIVE for fever, chills, change in weight  NEURO: NEGATIVE for weakness, dizziness or paresthesias  ENDOCRINE: NEGATIVE for temperature intolerance, skin/hair changes, polydipsia, polyphagia and polyuria  COR: no chest pains  RESP: no SOB    OBJECTIVE:                                                    /60  Pulse 66  Temp 97  F (36.1  C) (Tympanic)  Resp 16  Wt 191 lb (86.6 kg)  BMI 29.47 kg/m2  Body mass index is 29.47 kg/(m^2).  GENERAL APPEARANCE: healthy, alert and no distress    Diagnostic test results:  Results for orders placed or performed in visit on 08/23/17   UA with Microscopic   Result Value Ref Range    Color Urine Yellow     Appearance Urine Clear     Glucose Urine 500 (A) NEG^Negative mg/dL    Bilirubin Urine Negative NEG^Negative    Ketones Urine Negative NEG^Negative mg/dL    Specific Gravity Urine 1.025 1.003 - 1.035    pH Urine 5.5 5.0 - 7.0 pH    Protein Albumin Urine 100 (A) NEG^Negative mg/dL    Urobilinogen Urine 0.2 0.2 - 1.0 EU/dL    Nitrite Urine Negative NEG^Negative    Blood Urine Negative NEG^Negative    Leukocyte Esterase Urine Negative NEG^Negative    Source Midstream Urine     WBC Urine O - 2 OTO2^O - 2 /HPF    RBC Urine O - 2 OTO2^O - 2 /HPF   CBC with platelets differential   Result Value Ref Range    WBC 7.6 4.0 - 11.0 10e9/L    RBC Count 4.71 4.4 - 5.9 10e12/L    Hemoglobin 14.4 13.3 - 17.7 g/dL    Hematocrit 43.4 40.0 - 53.0 %    MCV 92 78 - 100 fl    MCH 30.6 26.5 - 33.0 pg    MCHC 33.2 31.5 - 36.5 g/dL    RDW  13.8 10.0 - 15.0 %    Platelet Count 281 150 - 450 10e9/L    Diff Method Automated Method     % Neutrophils 51.9 %    % Lymphocytes 33.0 %    % Monocytes 11.1 %    % Eosinophils 3.3 %    % Basophils 0.7 %    Absolute Neutrophil 4.0 1.6 - 8.3 10e9/L    Absolute Lymphocytes 2.5 0.8 - 5.3 10e9/L    Absolute Monocytes 0.8 0.0 - 1.3 10e9/L    Absolute Eosinophils 0.3 0.0 - 0.7 10e9/L    Absolute Basophils 0.1 0.0 - 0.2 10e9/L   Comprehensive metabolic panel   Result Value Ref Range    Sodium 139 133 - 144 mmol/L    Potassium 4.1 3.4 - 5.3 mmol/L    Chloride 105 94 - 109 mmol/L    Carbon Dioxide 24 20 - 32 mmol/L    Anion Gap 10 3 - 14 mmol/L    Glucose 209 (H) 70 - 99 mg/dL    Urea Nitrogen 18 7 - 30 mg/dL    Creatinine 1.10 0.66 - 1.25 mg/dL    GFR Estimate 67 >60 mL/min/1.7m2    GFR Estimate If Black 82 >60 mL/min/1.7m2    Calcium 6.5 (L) 8.5 - 10.1 mg/dL    Bilirubin Total 0.8 0.2 - 1.3 mg/dL    Albumin 3.9 3.4 - 5.0 g/dL    Protein Total 6.9 6.8 - 8.8 g/dL    Alkaline Phosphatase 94 40 - 150 U/L    ALT 51 0 - 70 U/L    AST 25 0 - 45 U/L   Lipid Profile   Result Value Ref Range    Cholesterol 141 <200 mg/dL    Triglycerides 154 (H) <150 mg/dL    HDL Cholesterol 57 >39 mg/dL    LDL Cholesterol Calculated 53 <100 mg/dL    Non HDL Cholesterol 84 <130 mg/dL   Albumin Random Urine Quantitative   Result Value Ref Range    Creatinine Urine 169 mg/dL    Albumin Urine mg/L 283 mg/L    Albumin Urine mg/g Cr 167.46 (H) 0 - 17 mg/g Cr   Hemoglobin A1c   Result Value Ref Range    Hemoglobin A1C 12.4 (H) 4.3 - 6.0 %          ASSESSMENT/PLAN:                                                      ICD-10-CM    1. Type 2 diabetes mellitus with diabetic nephropathy, with long-term current use of insulin (H) E11.21 DIABETES EDUCATOR REFERRAL    Z79.4    2. Type II diabetes mellitus with peripheral circulatory disorder (H) E11.51    3. Coronary artery disease involving native coronary artery of native heart without angina pectoris I25.10     4. Screening for diabetic peripheral neuropathy Z13.89 FOOT EXAM  NO CHARGE [00696.114]       Patient Instructions    I will send the patient for diabetic education at [facility].  I recommended that the patient's wife attend with him and she does most of the cooking.  We will increase his Lantus insulin to 26 units daily.  His other medications are stable and did not need to be refilled today.  He will follow-up with me in 1-2 months.  We had a lengthy discussion about organ systems affected by diabetes and the fact that closer control leads to less complications as time goes on.  Total time of the visit was 25 minutes over half in counseling on his diabetes.    Joby Phillips MD  [facility]

## 2017-09-21 ENCOUNTER — MYC REFILL (OUTPATIENT)
Dept: FAMILY MEDICINE | Facility: CLINIC | Age: 62
End: 2017-09-21

## 2017-09-21 DIAGNOSIS — E11.51 TYPE II DIABETES MELLITUS WITH PERIPHERAL CIRCULATORY DISORDER (H): Chronic | ICD-10-CM

## 2017-09-21 DIAGNOSIS — E11.59 TYPE 2 DIABETES MELLITUS WITH OTHER CIRCULATORY COMPLICATIONS (H): ICD-10-CM

## 2017-09-21 DIAGNOSIS — I10 ESSENTIAL HYPERTENSION WITH GOAL BLOOD PRESSURE LESS THAN 140/90: Chronic | ICD-10-CM

## 2017-09-21 RX ORDER — LISINOPRIL 10 MG/1
5 TABLET ORAL DAILY
Qty: 90 TABLET | Refills: 0 | Status: SHIPPED | OUTPATIENT
Start: 2017-09-21

## 2017-09-21 RX ORDER — SAXAGLIPTIN 5 MG/1
5 TABLET, FILM COATED ORAL DAILY
Qty: 90 TABLET | Refills: 0 | Status: SHIPPED | OUTPATIENT
Start: 2017-09-21

## 2017-09-21 RX ORDER — GLIPIZIDE 5 MG/1
5 TABLET ORAL
Qty: 90 TABLET | Refills: 1 | Status: SHIPPED | OUTPATIENT
Start: 2017-09-21

## 2017-09-21 NOTE — TELEPHONE ENCOUNTER
Message from [employer]:  Original authorizing provider: MD Matt Salazar would like a refill of the following medications:  glipiZIDE (GLUCOTROL) 5 MG tablet [Joby Phillips MD]  saxagliptin (ONGLYZA) 5 MG TABS [Joby Phillips MD]  lisinopril (PRINIVIL,ZESTRIL) 10 MG tablet [Joby Phillips MD]  metFORMIN (GLUCOPHAGE) 1000 MG tablet [Jboy Phillips MD]  insulin glargine (LANTUS SOLOSTAR) 100 UNIT/ML injection [Joby Phillips MD]    Preferred pharmacy: [id number] - [place], [place] - [address]    Comment:

## 2017-09-21 NOTE — TELEPHONE ENCOUNTER
Insulin glargine (lantus solostar) 100 unit/ml/metformin (glucophage) 1000 mg/saxagliptin ( onglyza) 5 mg/ glipizide ( gluotrol) 5 mg   Last Written Prescription Date: 9/12/17  /6/15/16  Last Fill Quantity: 9ml / 180 90 / 90, # refills: 0 / 1  Last Office Visit with [facility] or [facility] prescribing provider:  9/12/17   Next 5 appointments (look out 90 days)     Oct 16, 2017  9:00 AM CDT   PHYSICAL with Joby Phillips MD   [facility] ([facility]    [address] [place] [address] [id number] [phone]                   BP Readings from Last 3 Encounters:   09/12/17 128/60   08/22/17 130/70   04/17/17 126/70     Lab Results   Component Value Date    MICROL 283 08/23/2017     Lab Results   Component Value Date    UMALCR 167.46 08/23/2017     Creatinine   Date Value Ref Range Status   08/23/2017 1.10 0.66 - 1.25 mg/dL Final   ]  GFR Estimate   Date Value Ref Range Status   08/23/2017 67 >60 mL/min/1.7m2 Final     Comment:     Non  GFR Calc   04/17/2017 59 (L) >60 mL/min/1.7m2 Final     Comment:     Non  GFR Calc   05/11/2016 56 (L) >60 mL/min/1.7m2 Final     GFR Estimate If Black   Date Value Ref Range Status   08/23/2017 82 >60 mL/min/1.7m2 Final     Comment:      GFR Calc   04/17/2017 71 >60 mL/min/1.7m2 Final     Comment:      GFR Calc   05/11/2016 68 >60 mL/min/1.7m2 Final     Lab Results   Component Value Date    CHOL 141 08/23/2017     Lab Results   Component Value Date    HDL 57 08/23/2017     Lab Results   Component Value Date    LDL 53 08/23/2017     Lab Results   Component Value Date    TRIG 154 08/23/2017     Lab Results   Component Value Date    CHOLHDLRATIO 3.5 08/31/2015     Lab Results   Component Value Date    AST 25 08/23/2017     Lab Results   Component Value Date    ALT 51 08/23/2017     Lab Results   Component Value Date    A1C 12.4 08/23/2017     A1C 13.2 04/17/2017    A1C 11.0 05/11/2016    A1C 8.6 08/31/2015    A1C 6.6 03/04/2015     Potassium   Date Value Ref Range Status   08/23/2017 4.1 3.4 - 5.3 mmol/L Final       Lisinopril (prinopril (prinivil/zestril 10 mg  Last Written Prescription Date: 6/15/16  Last Fill Quantity: 90, # refills: 1  Last Office Visit with [facility], [facility] or [facility] prescribing provider: 9/12/17  Next 5 appointments (look out 90 days)     Oct 16, 2017  9:00 AM CDT   PHYSICAL with Joby Phillips MD   [facility]    [address] [place] [address] [phone]                   Potassium   Date Value Ref Range Status   08/23/2017 4.1 3.4 - 5.3 mmol/L Final     Creatinine   Date Value Ref Range Status   08/23/2017 1.10 0.66 - 1.25 mg/dL Final     BP Readings from Last 3 Encounters:   09/12/17 128/60   08/22/17 130/70   04/17/17 126/70     Medication is being filled for 1 time refill only due to:  keep scheduled appointment in October

## 2018-01-02 ENCOUNTER — TELEPHONE (OUTPATIENT)
Dept: FAMILY MEDICINE | Facility: CLINIC | Age: 63
End: 2018-01-02

## 2018-01-02 NOTE — LETTER
January 2, 2018    Buddy Riley [place]  [address] [address]    Dear Beth Gutierrez cares about your health and your health plan.  I have reviewed your medical conditions, medication list and lab results, and am making recommendations based on this review to better manage your health.    You are in particular need of attention regarding:  -Diabetes    I am recommending that you:     -schedule a FOLLOWUP OFFICE APPOINTMENT with me.  I will recheck your: A1c test.      Please call us at the [facility] location:  [phone] or use [employer] to address the above recommendations.     Thank you for trusting [facility].  We appreciate the opportunity to serve you and look forward to supporting your healthcare in the future.    If you have (or plan to have) any of these tests done at a facility other than a [facility] or a [facility], please have the results sent to the [facility] location noted above.      Best Regards,    Joby Phillips MD

## 2018-01-02 NOTE — TELEPHONE ENCOUNTER
"  Panel Management Review      Patient has the following on his problem list:     Diabetes    ASA: Failed    Last A1C  Lab Results   Component Value Date    A1C 12.4 08/23/2017    A1C 13.2 04/17/2017    A1C 11.0 05/11/2016    A1C 8.6 08/31/2015    A1C 6.6 03/04/2015     A1C tested: FAILED    Last LDL:    Lab Results   Component Value Date    CHOL 141 08/23/2017     Lab Results   Component Value Date    HDL 57 08/23/2017     Lab Results   Component Value Date    LDL 53 08/23/2017     Lab Results   Component Value Date    TRIG 154 08/23/2017     Lab Results   Component Value Date    CHOLHDLRATIO 3.5 08/31/2015     Lab Results   Component Value Date    NHDL 84 08/23/2017       Is the patient on a Statin? YES             Is the patient on Aspirin? YES    Medications     HMG CoA Reductase Inhibitors    simvastatin (ZOCOR) 40 MG tablet    Salicylates    aspirin  MG tablet          Last three blood pressure readings:  BP Readings from Last 3 Encounters:   09/12/17 128/60   08/22/17 130/70   04/17/17 126/70       Date of last diabetes office visit: 9/12/17     Tobacco History:     History   Smoking Status     Former Smoker     Packs/day: 1.00     Quit date: 1/10/2014   Smokeless Tobacco     Never Used     Comment: \"I think I am\"             Composite cancer screening  Chart review shows that this patient is due/due soon for the following None  Summary:    Patient is due/failing the following:   A1C    Action needed:   Patient needs office visit for diabetes f/u.    Type of outreach:    Sent letter.    Questions for provider review:    None                                                                                                                                    Montse Stevens CMA               "

## 2018-04-10 ENCOUNTER — TELEPHONE (OUTPATIENT)
Dept: FAMILY MEDICINE | Facility: CLINIC | Age: 63
End: 2018-04-10

## 2018-04-10 NOTE — TELEPHONE ENCOUNTER
"  Panel Management Review      Patient has the following on his problem list:     Diabetes    ASA: Failed    Last A1C  Lab Results   Component Value Date    A1C 12.4 08/23/2017    A1C 13.2 04/17/2017    A1C 11.0 05/11/2016    A1C 8.6 08/31/2015    A1C 6.6 03/04/2015     A1C tested: FAILED    Last LDL:    Lab Results   Component Value Date    CHOL 141 08/23/2017     Lab Results   Component Value Date    HDL 57 08/23/2017     Lab Results   Component Value Date    LDL 53 08/23/2017     Lab Results   Component Value Date    TRIG 154 08/23/2017     Lab Results   Component Value Date    CHOLHDLRATIO 3.5 08/31/2015     Lab Results   Component Value Date    NHDL 84 08/23/2017       Is the patient on a Statin? YES             Is the patient on Aspirin? YES    Medications     HMG CoA Reductase Inhibitors    simvastatin (ZOCOR) 40 MG tablet    Salicylates    aspirin  MG tablet          Last three blood pressure readings:  BP Readings from Last 3 Encounters:   09/12/17 128/60   08/22/17 130/70   04/17/17 126/70       Date of last diabetes office visit: 9/12/17     Tobacco History:     History   Smoking Status     Former Smoker     Packs/day: 1.00     Quit date: 1/10/2014   Smokeless Tobacco     Never Used     Comment: \"I think I am\"           Composite cancer screening  Chart review shows that this patient is due/due soon for the following None  Summary:    Patient is due/failing the following:   A1C    Action needed:   Patient needs office visit for diabetes.    Type of outreach:    Sent letter.    Questions for provider review:    None                                                                                                                                    Montse Stevens CMA                 "

## 2018-04-10 NOTE — LETTER
April 10, 2018    Buddy Riley [place]  [address] [address]    Dear Beth Gutierrez cares about your health and your health plan.  I have reviewed your medical conditions, medication list and lab results, and am making recommendations based on this review to better manage your health.    You are in particular need of attention regarding:  -Diabetes    I am recommending that you:     -schedule a FOLLOWUP OFFICE APPOINTMENT with me.  I will recheck your: A1c test.      Please call us at the [place] location:  [phone] or use [employer] to address the above recommendations.     Thank you for trusting [facility].  We appreciate the opportunity to serve you and look forward to supporting your healthcare in the future.    If you have (or plan to have) any of these tests done at a facility other than a [facility] or a [facility], please have the results sent to the [facility] location noted above.      Best Regards,    Joby Phillips MD

## 2018-04-12 DIAGNOSIS — E11.51 TYPE II DIABETES MELLITUS WITH PERIPHERAL CIRCULATORY DISORDER (H): ICD-10-CM

## 2018-04-12 RX ORDER — PEN NEEDLE, DIABETIC 31 GX3/16"
NEEDLE, DISPOSABLE MISCELLANEOUS
Qty: 100 EACH | Refills: 1 | Status: SHIPPED | OUTPATIENT
Start: 2018-04-12

## 2018-04-12 NOTE — TELEPHONE ENCOUNTER
"Requested Prescriptions   Pending Prescriptions Disp Refills     1ST TIER UNIFINE PENTIPS PLUS 31G X 5 MM [Pharmacy Med Name: 1st Tier Unifine Pentips Plus Miscellaneous 31G X 5 MM]  Last Written Prescription Date:  4/17/2017  Last Fill Quantity: 100 each,  # refills: 3   Last Office Visit  9/12/2017        with  [facility], [facility] or [facility] prescribing provider:     Future Office Visit:    100 each 2     Sig: USE AS DIRECTED TO INJECT INSULIN ONCE DAILY    Diabetic Supplies Protocol Failed    4/12/2018  7:00 AM       Failed - Recent (6 mo) or future (30 days) visit within the authorizing provider's specialty    Patient had office visit in the last 6 months or has a visit in the next 30 days with authorizing provider.  See \"Patient Info\" tab in inbasket, or \"Choose Columns\" in Meds & Orders section of the refill encounter.           Passed - Patient is 18 years of age or older          "

## 2019-09-30 ENCOUNTER — HEALTH MAINTENANCE LETTER (OUTPATIENT)
Age: 64
End: 2019-09-30

## 2020-03-15 ENCOUNTER — HEALTH MAINTENANCE LETTER (OUTPATIENT)
Age: 65
End: 2020-03-15

## 2021-01-15 ENCOUNTER — HEALTH MAINTENANCE LETTER (OUTPATIENT)
Age: 66
End: 2021-01-15

## 2021-05-09 ENCOUNTER — HEALTH MAINTENANCE LETTER (OUTPATIENT)
Age: 66
End: 2021-05-09

## 2021-08-29 ENCOUNTER — HEALTH MAINTENANCE LETTER (OUTPATIENT)
Age: 66
End: 2021-08-29

## 2021-10-24 ENCOUNTER — HEALTH MAINTENANCE LETTER (OUTPATIENT)
Age: 66
End: 2021-10-24

## 2022-04-10 ENCOUNTER — HEALTH MAINTENANCE LETTER (OUTPATIENT)
Age: 67
End: 2022-04-10

## 2022-06-05 ENCOUNTER — HEALTH MAINTENANCE LETTER (OUTPATIENT)
Age: 67
End: 2022-06-05

## 2022-10-15 ENCOUNTER — HEALTH MAINTENANCE LETTER (OUTPATIENT)
Age: 67
End: 2022-10-15

## 2023-06-01 ENCOUNTER — HEALTH MAINTENANCE LETTER (OUTPATIENT)
Age: 68
End: 2023-06-01

## 2023-06-11 ENCOUNTER — HEALTH MAINTENANCE LETTER (OUTPATIENT)
Age: 68
End: 2023-06-11

## 2024-01-07 ENCOUNTER — HEALTH MAINTENANCE LETTER (OUTPATIENT)
Age: 69
End: 2024-01-07